# Patient Record
Sex: FEMALE | Race: WHITE
[De-identification: names, ages, dates, MRNs, and addresses within clinical notes are randomized per-mention and may not be internally consistent; named-entity substitution may affect disease eponyms.]

---

## 2017-08-14 ENCOUNTER — RX RENEWAL (OUTPATIENT)
Age: 42
End: 2017-08-14

## 2017-08-14 PROBLEM — Z00.00 ENCOUNTER FOR PREVENTIVE HEALTH EXAMINATION: Status: ACTIVE | Noted: 2017-08-14

## 2017-09-11 ENCOUNTER — APPOINTMENT (OUTPATIENT)
Dept: OBGYN | Facility: CLINIC | Age: 42
End: 2017-09-11
Payer: COMMERCIAL

## 2017-09-11 VITALS
HEIGHT: 69 IN | SYSTOLIC BLOOD PRESSURE: 132 MMHG | WEIGHT: 129 LBS | BODY MASS INDEX: 19.11 KG/M2 | HEART RATE: 85 BPM | DIASTOLIC BLOOD PRESSURE: 98 MMHG

## 2017-09-11 PROCEDURE — 99214 OFFICE O/P EST MOD 30 MIN: CPT | Mod: 25

## 2017-09-11 PROCEDURE — 76856 US EXAM PELVIC COMPLETE: CPT

## 2017-11-10 ENCOUNTER — RX RENEWAL (OUTPATIENT)
Age: 42
End: 2017-11-10

## 2017-11-10 DIAGNOSIS — F41.9 ANXIETY DISORDER, UNSPECIFIED: ICD-10-CM

## 2017-12-06 ENCOUNTER — MESSAGE (OUTPATIENT)
Age: 42
End: 2017-12-06

## 2018-01-05 ENCOUNTER — MEDICATION RENEWAL (OUTPATIENT)
Age: 43
End: 2018-01-05

## 2018-01-05 ENCOUNTER — RX RENEWAL (OUTPATIENT)
Age: 43
End: 2018-01-05

## 2018-02-16 ENCOUNTER — RX RENEWAL (OUTPATIENT)
Age: 43
End: 2018-02-16

## 2018-04-16 ENCOUNTER — RX RENEWAL (OUTPATIENT)
Age: 43
End: 2018-04-16

## 2018-04-16 RX ORDER — ALPRAZOLAM 2 MG/1
2 TABLET ORAL
Qty: 90 | Refills: 0 | Status: COMPLETED | COMMUNITY
Start: 2017-11-10 | End: 2018-04-16

## 2018-06-05 ENCOUNTER — RX RENEWAL (OUTPATIENT)
Age: 43
End: 2018-06-05

## 2018-07-30 ENCOUNTER — RX RENEWAL (OUTPATIENT)
Age: 43
End: 2018-07-30

## 2018-09-13 ENCOUNTER — MEDICATION RENEWAL (OUTPATIENT)
Age: 43
End: 2018-09-13

## 2018-10-19 ENCOUNTER — RX RENEWAL (OUTPATIENT)
Age: 43
End: 2018-10-19

## 2018-11-02 ENCOUNTER — APPOINTMENT (OUTPATIENT)
Dept: OBGYN | Facility: CLINIC | Age: 43
End: 2018-11-02

## 2018-11-19 ENCOUNTER — APPOINTMENT (OUTPATIENT)
Dept: OBGYN | Facility: CLINIC | Age: 43
End: 2018-11-19
Payer: COMMERCIAL

## 2018-11-19 VITALS
HEIGHT: 69 IN | SYSTOLIC BLOOD PRESSURE: 128 MMHG | BODY MASS INDEX: 22.07 KG/M2 | HEART RATE: 75 BPM | DIASTOLIC BLOOD PRESSURE: 90 MMHG | WEIGHT: 149 LBS

## 2018-11-19 DIAGNOSIS — Z87.19 PERSONAL HISTORY OF OTHER DISEASES OF THE DIGESTIVE SYSTEM: ICD-10-CM

## 2018-11-19 DIAGNOSIS — Z82.49 FAMILY HISTORY OF ISCHEMIC HEART DISEASE AND OTHER DISEASES OF THE CIRCULATORY SYSTEM: ICD-10-CM

## 2018-11-19 DIAGNOSIS — Z78.9 OTHER SPECIFIED HEALTH STATUS: ICD-10-CM

## 2018-11-19 DIAGNOSIS — Z82.5 FAMILY HISTORY OF ASTHMA AND OTHER CHRONIC LOWER RESPIRATORY DISEASES: ICD-10-CM

## 2018-11-19 DIAGNOSIS — Z83.3 FAMILY HISTORY OF DIABETES MELLITUS: ICD-10-CM

## 2018-11-19 PROCEDURE — 99396 PREV VISIT EST AGE 40-64: CPT

## 2018-11-19 RX ORDER — OMEPRAZOLE 20 MG/1
TABLET, DELAYED RELEASE ORAL
Refills: 0 | Status: ACTIVE | COMMUNITY

## 2018-11-19 RX ORDER — LINACLOTIDE 72 UG/1
CAPSULE, GELATIN COATED ORAL
Refills: 0 | Status: ACTIVE | COMMUNITY

## 2018-11-20 LAB — HPV HIGH+LOW RISK DNA PNL CVX: NOT DETECTED

## 2018-11-26 ENCOUNTER — RX RENEWAL (OUTPATIENT)
Age: 43
End: 2018-11-26

## 2018-11-26 LAB — CYTOLOGY CVX/VAG DOC THIN PREP: NORMAL

## 2018-12-31 ENCOUNTER — RX RENEWAL (OUTPATIENT)
Age: 43
End: 2018-12-31

## 2019-02-01 ENCOUNTER — RX RENEWAL (OUTPATIENT)
Age: 44
End: 2019-02-01

## 2019-03-08 ENCOUNTER — RX RENEWAL (OUTPATIENT)
Age: 44
End: 2019-03-08

## 2019-03-18 ENCOUNTER — MESSAGE (OUTPATIENT)
Age: 44
End: 2019-03-18

## 2019-03-18 DIAGNOSIS — N91.2 AMENORRHEA, UNSPECIFIED: ICD-10-CM

## 2019-03-29 ENCOUNTER — CLINICAL ADVICE (OUTPATIENT)
Age: 44
End: 2019-03-29

## 2019-04-05 ENCOUNTER — OTHER (OUTPATIENT)
Age: 44
End: 2019-04-05

## 2019-05-17 ENCOUNTER — MEDICATION RENEWAL (OUTPATIENT)
Age: 44
End: 2019-05-17

## 2019-06-14 ENCOUNTER — MEDICATION RENEWAL (OUTPATIENT)
Age: 44
End: 2019-06-14

## 2019-07-12 ENCOUNTER — OTHER (OUTPATIENT)
Age: 44
End: 2019-07-12

## 2019-08-12 ENCOUNTER — RX RENEWAL (OUTPATIENT)
Age: 44
End: 2019-08-12

## 2019-08-16 ENCOUNTER — MEDICATION RENEWAL (OUTPATIENT)
Age: 44
End: 2019-08-16

## 2019-09-09 ENCOUNTER — RX RENEWAL (OUTPATIENT)
Age: 44
End: 2019-09-09

## 2019-10-07 ENCOUNTER — MESSAGE (OUTPATIENT)
Age: 44
End: 2019-10-07

## 2019-10-07 ENCOUNTER — MEDICATION RENEWAL (OUTPATIENT)
Age: 44
End: 2019-10-07

## 2019-10-07 DIAGNOSIS — N92.0 EXCESSIVE AND FREQUENT MENSTRUATION WITH REGULAR CYCLE: ICD-10-CM

## 2019-10-09 ENCOUNTER — MESSAGE (OUTPATIENT)
Age: 44
End: 2019-10-09

## 2019-10-09 LAB
BASOPHILS # BLD AUTO: 0.03 K/UL
BASOPHILS NFR BLD AUTO: 0.5 %
EOSINOPHIL # BLD AUTO: 0.38 K/UL
EOSINOPHIL NFR BLD AUTO: 6.2 %
HCT VFR BLD CALC: 38.2 %
HGB BLD-MCNC: 12.4 G/DL
IMM GRANULOCYTES NFR BLD AUTO: 0.3 %
LYMPHOCYTES # BLD AUTO: 1.32 K/UL
LYMPHOCYTES NFR BLD AUTO: 21.6 %
MAN DIFF?: NORMAL
MCHC RBC-ENTMCNC: 32.5 GM/DL
MCHC RBC-ENTMCNC: 32.9 PG
MCV RBC AUTO: 101.3 FL
MONOCYTES # BLD AUTO: 0.45 K/UL
MONOCYTES NFR BLD AUTO: 7.4 %
NEUTROPHILS # BLD AUTO: 3.9 K/UL
NEUTROPHILS NFR BLD AUTO: 64 %
PLATELET # BLD AUTO: 248 K/UL
RBC # BLD: 3.77 M/UL
RBC # FLD: 13 %
WBC # FLD AUTO: 6.1 K/UL

## 2019-11-04 ENCOUNTER — RX RENEWAL (OUTPATIENT)
Age: 44
End: 2019-11-04

## 2019-12-02 ENCOUNTER — RX RENEWAL (OUTPATIENT)
Age: 44
End: 2019-12-02

## 2019-12-06 ENCOUNTER — APPOINTMENT (OUTPATIENT)
Dept: ANTEPARTUM | Facility: CLINIC | Age: 44
End: 2019-12-06

## 2019-12-06 ENCOUNTER — APPOINTMENT (OUTPATIENT)
Dept: OBGYN | Facility: CLINIC | Age: 44
End: 2019-12-06
Payer: COMMERCIAL

## 2019-12-06 VITALS
HEIGHT: 69 IN | BODY MASS INDEX: 24.14 KG/M2 | WEIGHT: 163 LBS | SYSTOLIC BLOOD PRESSURE: 150 MMHG | DIASTOLIC BLOOD PRESSURE: 90 MMHG | HEART RATE: 95 BPM

## 2019-12-06 DIAGNOSIS — N95.1 MENOPAUSAL AND FEMALE CLIMACTERIC STATES: ICD-10-CM

## 2019-12-06 DIAGNOSIS — N92.6 IRREGULAR MENSTRUATION, UNSPECIFIED: ICD-10-CM

## 2019-12-06 DIAGNOSIS — N92.1 EXCESSIVE AND FREQUENT MENSTRUATION WITH IRREGULAR CYCLE: ICD-10-CM

## 2019-12-06 DIAGNOSIS — Z01.419 ENCOUNTER FOR GYNECOLOGICAL EXAMINATION (GENERAL) (ROUTINE) W/OUT ABNORMAL FINDINGS: ICD-10-CM

## 2019-12-06 LAB
BLOOD URINE: NORMAL
GLUCOSE QUALITATIVE U: NORMAL
KETONES URINE: NORMAL
LEUKOCYTE ESTERASE URINE: NORMAL
NITRITE URINE: NORMAL
PROTEIN URINE: NORMAL

## 2019-12-06 PROCEDURE — 99396 PREV VISIT EST AGE 40-64: CPT

## 2019-12-06 NOTE — PHYSICAL EXAM
[Awake] : awake [Alert] : alert [Acute Distress] : no acute distress [Well Developed] : well developed [Well Nourished] : well nourished [Normal Appearance] : was normal in appearance [Neck Supple] : was supple [Mass] : no breast mass [Enlarged Diffusely] : was not enlarged [Nipple Discharge] : no nipple discharge [Examination Of The Breasts] : a normal appearance [Axillary LAD] : no axillary lymphadenopathy [No Masses] : no breast masses were palpable [Tender] : non tender [Soft] : soft [Soft, Nontender] : the abdomen was soft and nontender [No HSM] : no hepatosplenomegaly noted [No Mass] : no masses were palpated [Oriented x3] : oriented to person, place, and time [Normal] : uterus [No Bleeding] : there was no active vaginal bleeding [Uterine Adnexae] : were not tender and not enlarged [Normal Rate] : normal [Normal S1] : normal S1 [Normal S2] : normal S2 [S4] : no S4 [S3] : no S3 [No Murmur] : no murmurs heard [No Pitting Edema] : no pitting edema present [Normal Carotids] : the carotid pulses were normal with no bruits [Arterial Pulses Equal At ___ Bilat (/N Is Sub: Default = /2)] : the peripheral pulses were 2+ and symmetric

## 2019-12-06 NOTE — HISTORY OF PRESENT ILLNESS
[1 Year Ago] : 1 year ago [Good] : being in good health [Healthy Diet] : a healthy diet [Regular Exercise] : regular exercise [Weight Concerns] : no concerns with her weight [Current] : risk screening reviewed and current [Reproductive Age] : is of reproductive age [Menstrual Problems] : reports normal menses [Pregnancy History] : pregnancy history: [Up to Date] : up to date with ~his/her~ STD screening [Fever] : no fever [Nausea] : no nausea [Vomiting] : no vomiting [Vaginal Bleeding] : no vaginal bleeding [Pelvic Pressure] : no pelvic pressure [Diarrhea] : no diarrhea [Dysuria] : no dysuria [Localized Pain] : no localized pain [Diffused Pain] : no diffused pain [Mass (___cm)] : no palpable mass [Nipple Discharge] : no nipple discharge [Skin Color Change] : no skin color change [[id number]] : none [Normal Amount/Duration] : was of a normal amount and duration [Spotting Between  Menses] : no spotting between menses [Regular Cycle Intervals] : periods have been irregular [Menstrual Cramps] : no menstrual cramps [On BCP at conception] : the patient was on BCP at conception [Currently In Menopause] : not currently in menopause [Experiencing Menopausal Sxs] : not experiencing menopausal symptoms [Sexually Active] : is sexually active [Monogamous] : is monogamous [Contraception] : uses contraception [Male ___] : [unfilled] male [NA] : N/A

## 2019-12-09 ENCOUNTER — MESSAGE (OUTPATIENT)
Age: 44
End: 2019-12-09

## 2019-12-09 DIAGNOSIS — F32.89 OTHER SPECIFIED DEPRESSIVE EPISODES: ICD-10-CM

## 2019-12-09 LAB
FSH SERPL-MCNC: 13.5 IU/L
HPV HIGH+LOW RISK DNA PNL CVX: NOT DETECTED
LH SERPL-ACNC: 17.3 IU/L
PROLACTIN SERPL-MCNC: 11.1 NG/ML
TSH SERPL-ACNC: 1.63 UIU/ML

## 2019-12-09 RX ORDER — FLUOXETINE HYDROCHLORIDE 10 MG/1
10 CAPSULE ORAL
Qty: 90 | Refills: 3 | Status: ACTIVE | COMMUNITY
Start: 2019-12-09 | End: 1900-01-01

## 2019-12-11 LAB — CYTOLOGY CVX/VAG DOC THIN PREP: NORMAL

## 2020-01-06 ENCOUNTER — RX RENEWAL (OUTPATIENT)
Age: 45
End: 2020-01-06

## 2020-01-06 ENCOUNTER — MESSAGE (OUTPATIENT)
Age: 45
End: 2020-01-06

## 2020-06-19 ENCOUNTER — RX RENEWAL (OUTPATIENT)
Age: 45
End: 2020-06-19

## 2020-09-21 ENCOUNTER — RX RENEWAL (OUTPATIENT)
Age: 45
End: 2020-09-21

## 2020-11-30 RX ORDER — ALPRAZOLAM 2 MG/1
2 TABLET ORAL
Qty: 90 | Refills: 0 | Status: ACTIVE | COMMUNITY
Start: 2017-08-14 | End: 1900-01-01

## 2020-12-21 ENCOUNTER — RX RENEWAL (OUTPATIENT)
Age: 45
End: 2020-12-21

## 2020-12-21 PROBLEM — Z01.419 ENCOUNTER FOR ANNUAL ROUTINE GYNECOLOGICAL EXAMINATION: Status: RESOLVED | Noted: 2018-11-19 | Resolved: 2020-12-21

## 2020-12-21 RX ORDER — FLUOXETINE HYDROCHLORIDE 20 MG/1
20 CAPSULE ORAL
Qty: 90 | Refills: 0 | Status: ACTIVE | COMMUNITY
Start: 2020-01-17 | End: 1900-01-01